# Patient Record
Sex: FEMALE | Race: BLACK OR AFRICAN AMERICAN
[De-identification: names, ages, dates, MRNs, and addresses within clinical notes are randomized per-mention and may not be internally consistent; named-entity substitution may affect disease eponyms.]

---

## 2018-02-26 ENCOUNTER — HOSPITAL ENCOUNTER (OUTPATIENT)
Dept: HOSPITAL 35 - HYPER | Age: 83
End: 2018-02-26
Attending: EMERGENCY MEDICINE
Payer: COMMERCIAL

## 2018-02-26 DIAGNOSIS — Z87.891: ICD-10-CM

## 2018-02-26 DIAGNOSIS — I87.2: Primary | ICD-10-CM

## 2018-02-26 DIAGNOSIS — Z98.49: ICD-10-CM

## 2018-02-26 DIAGNOSIS — L97.311: ICD-10-CM

## 2018-02-26 DIAGNOSIS — Z95.1: ICD-10-CM

## 2018-02-26 DIAGNOSIS — B35.1: ICD-10-CM

## 2018-02-26 DIAGNOSIS — M19.90: ICD-10-CM

## 2018-02-26 DIAGNOSIS — I10: ICD-10-CM

## 2018-02-26 DIAGNOSIS — I73.9: ICD-10-CM

## 2018-02-26 DIAGNOSIS — Z85.3: ICD-10-CM

## 2018-03-12 ENCOUNTER — HOSPITAL ENCOUNTER (OUTPATIENT)
Dept: HOSPITAL 35 - HYPER | Age: 83
End: 2018-03-12
Attending: EMERGENCY MEDICINE
Payer: COMMERCIAL

## 2018-03-12 DIAGNOSIS — Z85.3: ICD-10-CM

## 2018-03-12 DIAGNOSIS — I73.9: ICD-10-CM

## 2018-03-12 DIAGNOSIS — I70.233: Primary | ICD-10-CM

## 2018-03-12 DIAGNOSIS — M19.90: ICD-10-CM

## 2018-03-12 DIAGNOSIS — L97.311: ICD-10-CM

## 2018-03-12 DIAGNOSIS — Z87.891: ICD-10-CM

## 2018-03-27 ENCOUNTER — HOSPITAL ENCOUNTER (OUTPATIENT)
Dept: HOSPITAL 35 - HYPER | Age: 83
End: 2018-03-27
Attending: SPECIALIST
Payer: COMMERCIAL

## 2018-03-27 DIAGNOSIS — L97.311: ICD-10-CM

## 2018-03-27 DIAGNOSIS — B35.1: ICD-10-CM

## 2018-03-27 DIAGNOSIS — Z85.3: ICD-10-CM

## 2018-03-27 DIAGNOSIS — I10: ICD-10-CM

## 2018-03-27 DIAGNOSIS — I70.233: Primary | ICD-10-CM

## 2018-03-27 DIAGNOSIS — Z95.1: ICD-10-CM

## 2018-03-27 DIAGNOSIS — M19.90: ICD-10-CM

## 2018-03-27 DIAGNOSIS — Z87.891: ICD-10-CM

## 2018-04-03 ENCOUNTER — HOSPITAL ENCOUNTER (OUTPATIENT)
Dept: HOSPITAL 61 - PCVCIMAG | Age: 83
Discharge: HOME | End: 2018-04-03
Payer: MEDICARE

## 2018-04-03 DIAGNOSIS — I73.9: Primary | ICD-10-CM

## 2018-04-03 DIAGNOSIS — I70.8: ICD-10-CM

## 2018-04-03 DIAGNOSIS — L97.519: ICD-10-CM

## 2018-04-03 PROCEDURE — 93925 LOWER EXTREMITY STUDY: CPT

## 2018-04-10 ENCOUNTER — HOSPITAL ENCOUNTER (OUTPATIENT)
Dept: HOSPITAL 61 - PCVCCLINIC | Age: 83
Discharge: HOME | End: 2018-04-10
Attending: RADIOLOGY
Payer: MEDICARE

## 2018-04-10 DIAGNOSIS — Z87.891: ICD-10-CM

## 2018-04-10 DIAGNOSIS — I10: ICD-10-CM

## 2018-04-10 DIAGNOSIS — M79.606: ICD-10-CM

## 2018-04-10 DIAGNOSIS — I73.9: Primary | ICD-10-CM

## 2018-04-10 DIAGNOSIS — C50.919: ICD-10-CM

## 2018-04-10 DIAGNOSIS — Z79.899: ICD-10-CM

## 2018-04-10 DIAGNOSIS — L97.909: ICD-10-CM

## 2018-04-10 PROCEDURE — 36415 COLL VENOUS BLD VENIPUNCTURE: CPT

## 2018-04-13 ENCOUNTER — HOSPITAL ENCOUNTER (OUTPATIENT)
Dept: HOSPITAL 61 - PCVCCLINIC | Age: 83
Discharge: HOME | End: 2018-04-13
Attending: RADIOLOGY
Payer: MEDICARE

## 2018-04-13 DIAGNOSIS — I73.9: Primary | ICD-10-CM

## 2018-04-13 DIAGNOSIS — I10: ICD-10-CM

## 2018-04-13 PROCEDURE — 36415 COLL VENOUS BLD VENIPUNCTURE: CPT

## 2018-04-17 ENCOUNTER — HOSPITAL ENCOUNTER (OUTPATIENT)
Dept: HOSPITAL 61 - PCVCINTER | Age: 83
Discharge: HOME | End: 2018-04-17
Attending: RADIOLOGY
Payer: MEDICARE

## 2018-04-17 DIAGNOSIS — I70.1: ICD-10-CM

## 2018-04-17 DIAGNOSIS — I70.233: Primary | ICD-10-CM

## 2018-04-17 DIAGNOSIS — I25.10: ICD-10-CM

## 2018-04-17 DIAGNOSIS — I10: ICD-10-CM

## 2018-04-17 DIAGNOSIS — L97.319: ICD-10-CM

## 2018-04-17 DIAGNOSIS — I70.0: ICD-10-CM

## 2018-04-17 PROCEDURE — 99153 MOD SED SAME PHYS/QHP EA: CPT

## 2018-04-17 PROCEDURE — 99152 MOD SED SAME PHYS/QHP 5/>YRS: CPT

## 2018-04-17 PROCEDURE — 76937 US GUIDE VASCULAR ACCESS: CPT

## 2018-04-17 PROCEDURE — 75716 ARTERY X-RAYS ARMS/LEGS: CPT

## 2018-04-17 PROCEDURE — 36252 INS CATH REN ART 1ST BILAT: CPT

## 2018-04-17 PROCEDURE — 37231: CPT

## 2018-04-24 ENCOUNTER — HOSPITAL ENCOUNTER (OUTPATIENT)
Dept: HOSPITAL 35 - HYPER | Age: 83
End: 2018-04-24
Attending: SPECIALIST
Payer: COMMERCIAL

## 2018-04-24 DIAGNOSIS — I10: ICD-10-CM

## 2018-04-24 DIAGNOSIS — B35.1: ICD-10-CM

## 2018-04-24 DIAGNOSIS — Z85.3: ICD-10-CM

## 2018-04-24 DIAGNOSIS — I70.233: Primary | ICD-10-CM

## 2018-04-24 DIAGNOSIS — Z87.891: ICD-10-CM

## 2018-04-24 DIAGNOSIS — L97.311: ICD-10-CM

## 2018-04-24 DIAGNOSIS — Z95.1: ICD-10-CM

## 2018-04-24 DIAGNOSIS — M19.90: ICD-10-CM

## 2018-05-15 ENCOUNTER — HOSPITAL ENCOUNTER (OUTPATIENT)
Dept: HOSPITAL 35 - HYPER | Age: 83
End: 2018-05-15
Attending: PREVENTIVE MEDICINE
Payer: COMMERCIAL

## 2018-05-15 DIAGNOSIS — L97.311: ICD-10-CM

## 2018-05-15 DIAGNOSIS — I10: ICD-10-CM

## 2018-05-15 DIAGNOSIS — I70.233: Primary | ICD-10-CM

## 2018-05-15 DIAGNOSIS — I73.9: ICD-10-CM

## 2018-05-15 DIAGNOSIS — M19.90: ICD-10-CM

## 2018-05-15 DIAGNOSIS — Z87.891: ICD-10-CM

## 2018-05-29 ENCOUNTER — HOSPITAL ENCOUNTER (OUTPATIENT)
Dept: HOSPITAL 35 - HYPER | Age: 83
End: 2018-05-29
Attending: PREVENTIVE MEDICINE
Payer: COMMERCIAL

## 2018-05-29 DIAGNOSIS — L97.311: ICD-10-CM

## 2018-05-29 DIAGNOSIS — M19.90: ICD-10-CM

## 2018-05-29 DIAGNOSIS — Z98.49: ICD-10-CM

## 2018-05-29 DIAGNOSIS — Z95.1: ICD-10-CM

## 2018-05-29 DIAGNOSIS — Z87.891: ICD-10-CM

## 2018-05-29 DIAGNOSIS — L84: ICD-10-CM

## 2018-05-29 DIAGNOSIS — I10: ICD-10-CM

## 2018-05-29 DIAGNOSIS — B35.1: ICD-10-CM

## 2018-05-29 DIAGNOSIS — I70.233: Primary | ICD-10-CM

## 2018-05-29 DIAGNOSIS — Z85.3: ICD-10-CM

## 2018-05-31 ENCOUNTER — HOSPITAL ENCOUNTER (OUTPATIENT)
Dept: HOSPITAL 61 - PCVCIMAG | Age: 83
Discharge: HOME | End: 2018-05-31
Attending: RADIOLOGY
Payer: MEDICARE

## 2018-05-31 DIAGNOSIS — Z87.891: ICD-10-CM

## 2018-05-31 DIAGNOSIS — I77.9: ICD-10-CM

## 2018-05-31 DIAGNOSIS — Z79.899: ICD-10-CM

## 2018-05-31 DIAGNOSIS — I73.9: ICD-10-CM

## 2018-05-31 DIAGNOSIS — Z79.82: ICD-10-CM

## 2018-05-31 DIAGNOSIS — I70.8: ICD-10-CM

## 2018-05-31 DIAGNOSIS — I10: ICD-10-CM

## 2018-05-31 DIAGNOSIS — I65.23: Primary | ICD-10-CM

## 2018-05-31 PROCEDURE — 93926 LOWER EXTREMITY STUDY: CPT

## 2018-05-31 PROCEDURE — 93880 EXTRACRANIAL BILAT STUDY: CPT

## 2018-06-26 ENCOUNTER — HOSPITAL ENCOUNTER (OUTPATIENT)
Dept: HOSPITAL 35 - HYPER | Age: 83
End: 2018-06-26
Attending: PREVENTIVE MEDICINE
Payer: COMMERCIAL

## 2018-06-26 DIAGNOSIS — B35.1: ICD-10-CM

## 2018-06-26 DIAGNOSIS — L97.312: ICD-10-CM

## 2018-06-26 DIAGNOSIS — L84: ICD-10-CM

## 2018-06-26 DIAGNOSIS — M19.90: ICD-10-CM

## 2018-06-26 DIAGNOSIS — I10: ICD-10-CM

## 2018-06-26 DIAGNOSIS — Z95.1: ICD-10-CM

## 2018-06-26 DIAGNOSIS — Z98.49: ICD-10-CM

## 2018-06-26 DIAGNOSIS — Z85.3: ICD-10-CM

## 2018-06-26 DIAGNOSIS — I70.233: Primary | ICD-10-CM

## 2018-06-26 DIAGNOSIS — Z87.891: ICD-10-CM

## 2018-07-10 ENCOUNTER — HOSPITAL ENCOUNTER (OUTPATIENT)
Dept: HOSPITAL 35 - HYPER | Age: 83
End: 2018-07-10
Attending: PREVENTIVE MEDICINE
Payer: COMMERCIAL

## 2018-07-10 DIAGNOSIS — Z85.3: ICD-10-CM

## 2018-07-10 DIAGNOSIS — Z87.891: ICD-10-CM

## 2018-07-10 DIAGNOSIS — I10: ICD-10-CM

## 2018-07-10 DIAGNOSIS — L97.312: ICD-10-CM

## 2018-07-10 DIAGNOSIS — M19.90: ICD-10-CM

## 2018-07-10 DIAGNOSIS — I70.233: Primary | ICD-10-CM

## 2018-07-10 DIAGNOSIS — Z95.1: ICD-10-CM

## 2018-07-24 ENCOUNTER — HOSPITAL ENCOUNTER (OUTPATIENT)
Dept: HOSPITAL 35 - HYPER | Age: 83
End: 2018-07-24
Attending: EMERGENCY MEDICINE
Payer: COMMERCIAL

## 2018-07-24 DIAGNOSIS — Z95.1: ICD-10-CM

## 2018-07-24 DIAGNOSIS — L97.312: ICD-10-CM

## 2018-07-24 DIAGNOSIS — I10: ICD-10-CM

## 2018-07-24 DIAGNOSIS — Z98.49: ICD-10-CM

## 2018-07-24 DIAGNOSIS — L84: ICD-10-CM

## 2018-07-24 DIAGNOSIS — Z85.3: ICD-10-CM

## 2018-07-24 DIAGNOSIS — B35.1: ICD-10-CM

## 2018-07-24 DIAGNOSIS — M19.90: ICD-10-CM

## 2018-07-24 DIAGNOSIS — Z87.891: ICD-10-CM

## 2018-07-24 DIAGNOSIS — I70.233: Primary | ICD-10-CM

## 2018-08-07 ENCOUNTER — HOSPITAL ENCOUNTER (OUTPATIENT)
Dept: HOSPITAL 35 - HYPER | Age: 83
End: 2018-08-07
Attending: PREVENTIVE MEDICINE
Payer: COMMERCIAL

## 2018-08-07 DIAGNOSIS — Z98.49: ICD-10-CM

## 2018-08-07 DIAGNOSIS — L97.312: ICD-10-CM

## 2018-08-07 DIAGNOSIS — I70.233: Primary | ICD-10-CM

## 2018-08-07 DIAGNOSIS — B35.1: ICD-10-CM

## 2018-08-07 DIAGNOSIS — Z95.1: ICD-10-CM

## 2018-08-07 DIAGNOSIS — I10: ICD-10-CM

## 2018-08-07 DIAGNOSIS — Z87.891: ICD-10-CM

## 2018-08-07 DIAGNOSIS — Z85.3: ICD-10-CM

## 2018-08-07 DIAGNOSIS — M19.90: ICD-10-CM

## 2018-10-04 ENCOUNTER — HOSPITAL ENCOUNTER (OUTPATIENT)
Dept: HOSPITAL 61 - PCVCIMAG | Age: 83
Discharge: HOME | End: 2018-10-04
Attending: RADIOLOGY
Payer: MEDICARE

## 2018-10-04 DIAGNOSIS — Z87.891: ICD-10-CM

## 2018-10-04 DIAGNOSIS — C50.919: ICD-10-CM

## 2018-10-04 DIAGNOSIS — L97.909: ICD-10-CM

## 2018-10-04 DIAGNOSIS — I10: ICD-10-CM

## 2018-10-04 DIAGNOSIS — I77.9: ICD-10-CM

## 2018-10-04 DIAGNOSIS — I73.9: Primary | ICD-10-CM

## 2018-10-04 DIAGNOSIS — L98.491: ICD-10-CM

## 2018-10-04 PROCEDURE — 93926 LOWER EXTREMITY STUDY: CPT

## 2018-10-04 PROCEDURE — G0463 HOSPITAL OUTPT CLINIC VISIT: HCPCS

## 2018-10-04 NOTE — PCVCIMAG
EXAM: RIGHT LOWER EXTREMITY ARTERIAL DUPLEX



INDICATION: Peripheral Arterial Disease. Leg pain.



FINDINGS: 

Right Leg: Common femoral profunda femoral arteries are patent.

Increased systolic velocity distal superficial femoral artery of 351

cm/s consistent with patient's known 60-70% stenosis in the mid and

distal vessel. Popliteal artery is patent. Occlusion of the peroneal

and posterior tibial arteries are unchanged. Anterior tibial artery

has good patency throughout including the proximal stents.    



IMPRESSION: 

Unchanged 60-70% stenosis mid/distal native right superficial femoral

artery.

Proximal right anterior tibial artery stents are maintaining good

patency.

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

arteries.



LOC:[id number] Daily Assessment

## 2018-12-11 ENCOUNTER — HOSPITAL ENCOUNTER (OUTPATIENT)
Dept: HOSPITAL 35 - HYPER | Age: 83
End: 2018-12-11
Attending: PREVENTIVE MEDICINE
Payer: COMMERCIAL

## 2018-12-11 DIAGNOSIS — B35.1: ICD-10-CM

## 2018-12-11 DIAGNOSIS — M19.90: ICD-10-CM

## 2018-12-11 DIAGNOSIS — Z85.3: ICD-10-CM

## 2018-12-11 DIAGNOSIS — R54: ICD-10-CM

## 2018-12-11 DIAGNOSIS — R60.0: ICD-10-CM

## 2018-12-11 DIAGNOSIS — L84: ICD-10-CM

## 2018-12-11 DIAGNOSIS — I70.233: Primary | ICD-10-CM

## 2018-12-11 DIAGNOSIS — I10: ICD-10-CM

## 2018-12-11 DIAGNOSIS — L97.312: ICD-10-CM

## 2018-12-11 DIAGNOSIS — Z87.891: ICD-10-CM

## 2018-12-11 DIAGNOSIS — L97.821: ICD-10-CM

## 2019-01-03 ENCOUNTER — HOSPITAL ENCOUNTER (OUTPATIENT)
Dept: HOSPITAL 35 - HYPER | Age: 84
End: 2019-01-03
Attending: PREVENTIVE MEDICINE
Payer: COMMERCIAL

## 2019-01-03 DIAGNOSIS — Z85.3: ICD-10-CM

## 2019-01-03 DIAGNOSIS — I87.302: ICD-10-CM

## 2019-01-03 DIAGNOSIS — L84: ICD-10-CM

## 2019-01-03 DIAGNOSIS — L97.312: ICD-10-CM

## 2019-01-03 DIAGNOSIS — Z87.891: ICD-10-CM

## 2019-01-03 DIAGNOSIS — I70.233: Primary | ICD-10-CM

## 2019-01-03 DIAGNOSIS — M19.90: ICD-10-CM
